# Patient Record
Sex: MALE | Race: WHITE | NOT HISPANIC OR LATINO | Employment: FULL TIME | URBAN - METROPOLITAN AREA
[De-identification: names, ages, dates, MRNs, and addresses within clinical notes are randomized per-mention and may not be internally consistent; named-entity substitution may affect disease eponyms.]

---

## 2018-09-11 ENCOUNTER — CONVERSION ENCOUNTER (OUTPATIENT)
Dept: OTHER | Facility: HOSPITAL | Age: 48
End: 2018-09-11

## 2018-09-25 ENCOUNTER — HOSPITAL ENCOUNTER (OUTPATIENT)
Dept: SLEEP MEDICINE | Facility: HOSPITAL | Age: 48
Discharge: HOME | End: 2018-09-25
Attending: PSYCHIATRY & NEUROLOGY | Admitting: PSYCHIATRY & NEUROLOGY

## 2019-06-04 VITALS
BODY MASS INDEX: 39.12 KG/M2 | DIASTOLIC BLOOD PRESSURE: 77 MMHG | SYSTOLIC BLOOD PRESSURE: 116 MMHG | WEIGHT: 249.25 LBS | HEART RATE: 76 BPM | HEIGHT: 67 IN

## 2019-07-16 ENCOUNTER — TELEPHONE (OUTPATIENT)
Dept: NEUROLOGY | Facility: CLINIC | Age: 49
End: 2019-07-16

## 2020-04-28 ENCOUNTER — TELEPHONE (OUTPATIENT)
Dept: NEUROLOGY | Facility: CLINIC | Age: 50
End: 2020-04-28

## 2020-04-28 NOTE — TELEPHONE ENCOUNTER
Has the patient been seen in the last 6 months? NO  If the patient has not been seen and the machine is broken, schedule an appointment and alert the practice  Current issue/concern with equipment: NO  DME company: [employer]  What kind of mask type (full or nasal)? FULL  Are you on a modem or chip? CHIP

## 2020-06-08 ENCOUNTER — OFFICE VISIT (OUTPATIENT)
Dept: NEUROLOGY | Facility: CLINIC | Age: 50
End: 2020-06-08

## 2020-06-08 ENCOUNTER — TELEPHONE (OUTPATIENT)
Dept: NEUROLOGY | Facility: CLINIC | Age: 50
End: 2020-06-08

## 2020-06-08 VITALS
DIASTOLIC BLOOD PRESSURE: 80 MMHG | HEART RATE: 76 BPM | SYSTOLIC BLOOD PRESSURE: 162 MMHG | HEIGHT: 68 IN | WEIGHT: 252.6 LBS | BODY MASS INDEX: 38.28 KG/M2 | TEMPERATURE: 98 F

## 2020-06-08 DIAGNOSIS — G47.33 OBSTRUCTIVE SLEEP APNEA: Primary | ICD-10-CM

## 2020-06-08 DIAGNOSIS — E66.09 CLASS 2 OBESITY DUE TO EXCESS CALORIES WITH BODY MASS INDEX (BMI) OF 39.0 TO 39.9 IN ADULT, UNSPECIFIED WHETHER SERIOUS COMORBIDITY PRESENT: ICD-10-CM

## 2020-06-08 PROBLEM — I10 HYPERTENSION: Status: ACTIVE | Noted: 2018-09-11

## 2020-06-08 PROBLEM — E78.5 HYPERLIPIDEMIA: Status: ACTIVE | Noted: 2018-09-11

## 2020-06-08 PROBLEM — R09.02 HYPOXIA: Status: ACTIVE | Noted: 2018-10-17

## 2020-06-08 PROCEDURE — FORMSMISC: Performed by: PSYCHIATRY & NEUROLOGY

## 2020-06-08 PROCEDURE — 99213 OFFICE O/P EST LOW 20 MIN: CPT | Performed by: PSYCHIATRY & NEUROLOGY

## 2020-06-08 RX ORDER — ATORVASTATIN CALCIUM 20 MG/1
TABLET, FILM COATED ORAL
COMMUNITY
Start: 2020-06-05

## 2020-06-08 RX ORDER — LISINOPRIL AND HYDROCHLOROTHIAZIDE 12.5; 1 MG/1; MG/1
TABLET ORAL EVERY 24 HOURS
COMMUNITY
Start: 2018-09-11 | End: 2022-06-09

## 2020-06-08 NOTE — PROGRESS NOTES
Sleep medicine follow-up visit    Aidan Hollingsworth   1970  49 y.o. male   DATE OF SERVICE: 6/8/2020     Yearly f/u for CPAP compliance, patient is a  and doing well with pap therapy. Patient f/u from sleep study with new cpap machine, sleeping well uses a FFM and goes through [employer] for supplies.     SLEEP TESTING HISTORY:    On NPSG at [facility] , 09/26/2018 patient had Severe obstructive sleep apnea syndrome with apnea-hypopnea index of 70.1 per sleep hour, minimum SpO2 of 59%    The compliance data reviewed and the patient is on CPAP therapy at 10-20 cm/H2O and compliance data indicates excellent compliance with 95% usage for more than 4 hours with an average usage of 6 hours 31 minutes. AHI down to 4.7 with CPAP therapy and mean CPAP pressure 12.9 cm of water.  The patient's hypersomnia also resolved with [place] Sleepiness Scale score of 0 with CPAP therapy.  The patient feels great and is benefiting from it and is compliant.     Review of Systems   Constitutional: Negative for appetite change and fatigue.   HENT: Negative for sinus pressure and sinus pain.    Eyes: Negative for pain and itching.   Respiratory: Positive for apnea. Negative for cough and shortness of breath.    Gastrointestinal: Negative for constipation and diarrhea.   Endocrine: Negative for cold intolerance and heat intolerance.   Genitourinary: Negative for difficulty urinating and frequency.   Musculoskeletal: Negative for back pain and neck pain.   Allergic/Immunologic: Negative for environmental allergies and food allergies.   Neurological: Negative for dizziness, tremors, seizures, syncope, facial asymmetry, speech difficulty, weakness, light-headedness, numbness and headaches.   Psychiatric/Behavioral: Negative for agitation and confusion.     I reviewed and addressed ROS entered by MA.        The following portions of the patient's history were reviewed and updated as appropriate: allergies, current medications, past family  history, past medical history, past social history, past surgical history and problem list.      No family history on file.    No past medical history on file.    Social History     Socioeconomic History   • Marital status:      Spouse name: Not on file   • Number of children: Not on file   • Years of education: Not on file   • Highest education level: Not on file       No current outpatient medications on file.    Allergies not on file     PHYSICAL EXAMINATION:  There were no vitals filed for this visit.   There is no height or weight on file to calculate BMI.       HEENT: Normal.      EXTREMITIES: No edema.     IMPRESSION:     Patient with obstructive sleep apnea syndrome with hypersomnia successfully treated with CPAP therapy and is compliant and benefiting from it.     Obesity, bmi 39,    Pt cleared for CDL    RECOMMENDATIONS:   1. Continue present CPAP.   2. Follow up 1 year.   3 will increase min pressure to 12  4 pt encouraged to lose weight    [place] SLEEPINESS SCALE  Sitting and reading  0  WatchingTV  0  Sitting, inactive, in a public place  0  As a passenger in a car for 1 hour w/o a break  0  Lying down to rest in the afternoon  0  Sitting and talking to someone  0  Sitting quietly after a lunch  0  In a car, while stopped for traffic or a light  0  Total 0        This document has been electronically signed by Joseph Seipel, MD on June 8, 2020 07:24

## 2021-06-10 NOTE — PROGRESS NOTES
"Chief Complaint  Sleep Apnea    Subjective          Aidan Hollingsworth presents to [facility] NEUROLOG[facility]  History of Present Illness   Yearly f/u for CPAP compliance, patient is a  and doing well with pap therapy.Sleeping well uses a FFM and goes through [employer] for supplies.     Sleep testing history:    On NPSG at [facility] , 09/26/2018 patient had Severe obstructive sleep apnea syndrome with apnea-hypopnea index of 70.1 per sleep hour, minimum SpO2 of 59%    PAP download:  The patient is on CPAP therapy at 14-20 cm/H2O.   Data indicates Excellent compliance. With 93% usage for more than 4 hours with an average usage of 6 hours 1 minutes. AHI down to 4.3 .  Average pressures 13.8.  Average large leak 26min.     The patient's hypersomnia has resolved       [place] Sleepiness Scale:  Sitting and reading 0 WatchingTV 0  Sitting, inactive, in a public place 0  As a passenger in a car for 1 hour w/o a break  0  Lying down to rest in the afternoon  0  Sitting and talking to someone  0  Sitting quietly after a lunch  0  In a car, while stopped for traffic or a light  0  Total 0  Review of Systems   Constitutional: Negative for fatigue and fever.   HENT: Negative for ear discharge and ear pain.    Eyes: Negative for pain and itching.   Respiratory: Negative for cough and shortness of breath.    Cardiovascular: Negative for chest pain.   Gastrointestinal: Negative for abdominal pain and nausea.   Endocrine: Negative for cold intolerance.   Genitourinary: Negative for urgency.   Musculoskeletal: Negative for back pain and neck pain.   Neurological: Negative for dizziness and light-headedness.   Psychiatric/Behavioral: Negative for agitation, confusion and sleep disturbance.       Objective   Vital Signs:   /63 (BP Location: Right arm, Patient Position: Sitting, Cuff Size: Adult)   Pulse 76   Temp 97.3 °F (36.3 °C)   Ht 171.5 cm (67.5\")   Wt 118 kg (260 lb)   BMI 40.12 kg/m²     Physical " Exam  Vitals reviewed.   Neurological:      Mental Status: He is alert and oriented to person, place, and time.        Result Review :                 Assessment and Plan    Diagnoses and all orders for this visit:    1. Obstructive sleep apnea (Primary)      Will increase pressure to 14-20  The patient is compliant with and benefiting from PAP therapy.      Follow Up   Return in about 1 year (around 6/16/2022).  Patient was given instructions and counseling regarding his condition or for health maintenance advice. Please see specific information pulled into the AVS if appropriate.

## 2021-06-16 ENCOUNTER — OFFICE VISIT (OUTPATIENT)
Dept: NEUROLOGY | Facility: CLINIC | Age: 51
End: 2021-06-16

## 2021-06-16 VITALS
SYSTOLIC BLOOD PRESSURE: 143 MMHG | DIASTOLIC BLOOD PRESSURE: 63 MMHG | HEART RATE: 76 BPM | TEMPERATURE: 97.3 F | HEIGHT: 68 IN | WEIGHT: 260 LBS | BODY MASS INDEX: 39.4 KG/M2

## 2021-06-16 DIAGNOSIS — G47.33 OBSTRUCTIVE SLEEP APNEA: Primary | ICD-10-CM

## 2021-06-16 PROCEDURE — 99212 OFFICE O/P EST SF 10 MIN: CPT | Performed by: PSYCHIATRY & NEUROLOGY

## 2021-06-16 RX ORDER — LOSARTAN POTASSIUM AND HYDROCHLOROTHIAZIDE 12.5; 5 MG/1; MG/1
1 TABLET ORAL DAILY
COMMUNITY
Start: 2021-05-26

## 2022-06-08 NOTE — PROGRESS NOTES
Chief Complaint  Sleep Apnea    Subjective          Aidan Hollingsworth presents to [facility] NEUROLOGY  History of Present Illness  Yearly f/u for CPAP compliance, patient is a  and doing well and benefiting from pap therapy,     uses a FFM and goes through [employer] for supplies.     patient states he has not registered his machine for recall yet he is unsure if its recalled ( info given)     Sleep testing history:    On NPSG at [facility] , 09/26/2018 patient had Severe obstructive sleep apnea syndrome with apnea-hypopnea index of 70.1 per sleep hour, minimum SpO2 of 59%    PAP download:  The patient is on CPAP therapy at 13.5-20 cm/H2O.   Data indicates Excellent compliance. With 91% usage for more than 4 hours with an average usage of 5 hours 56 minutes. AHI down to 3.7 .  Average pressures 14.9.  Average large leak 3.5min.     The patient's hypersomnia has resolved       [place] Sleepiness Scale:  Sitting and reading 1 WatchingTV 1  Sitting, inactive, in a public place 0  As a passenger in a car for 1 hour w/o a break  0  Lying down to rest in the afternoon  1  Sitting and talking to someone  0  Sitting quietly after a lunch  0  In a car, while stopped for traffic or a light  0  Total 3      Review of Systems   Constitutional: Negative for fatigue.   HENT: Negative for sinus pressure, sinus pain and sore throat.    Eyes: Negative for pain and itching.   Respiratory: Positive for apnea. Negative for shortness of breath.    Cardiovascular: Negative for chest pain and palpitations.   Gastrointestinal: Negative for abdominal distention and abdominal pain.   Genitourinary: Negative for frequency and urgency.   Musculoskeletal: Negative for neck pain and neck stiffness.   Neurological: Negative for dizziness, light-headedness and headaches.   Psychiatric/Behavioral: Negative for decreased concentration and sleep disturbance.     Objective   Vital Signs:   /76   Pulse 65   Temp 98.2 °F (36.8  "°C) (Temporal)   Resp 16   Ht 171.5 cm (67.5\")   Wt 117 kg (258 lb)   BMI 39.81 kg/m²     Physical Exam  Vitals reviewed.   Cardiovascular:      Pulses: Normal pulses.   Pulmonary:      Effort: Pulmonary effort is normal. No respiratory distress.   Neurological:      General: No focal deficit present.      Mental Status: He is alert and oriented to person, place, and time.   Psychiatric:         Mood and Affect: Mood normal.        Result Review :                 Assessment and Plan    Diagnoses and all orders for this visit:    1. Obstructive sleep apnea (Primary)      continue CPAP at current pressure     The patient is compliant with and benefiting from PAP therapy.      Follow Up   Return in about 1 year (around 6/9/2023).    Patient was given instructions and counseling regarding his condition or for health maintenance advice. Please see specific information pulled into the AVS if appropriate.       This document has been electronically signed by Joseph Seipel, MD on June 9, 2022 09:21 EDT  "

## 2022-06-09 ENCOUNTER — OFFICE VISIT (OUTPATIENT)
Dept: NEUROLOGY | Facility: CLINIC | Age: 52
End: 2022-06-09

## 2022-06-09 VITALS
WEIGHT: 258 LBS | BODY MASS INDEX: 39.1 KG/M2 | HEART RATE: 65 BPM | DIASTOLIC BLOOD PRESSURE: 76 MMHG | HEIGHT: 68 IN | SYSTOLIC BLOOD PRESSURE: 136 MMHG | TEMPERATURE: 98.2 F | RESPIRATION RATE: 16 BRPM

## 2022-06-09 DIAGNOSIS — G47.33 OBSTRUCTIVE SLEEP APNEA: Primary | ICD-10-CM

## 2022-06-09 PROCEDURE — 99213 OFFICE O/P EST LOW 20 MIN: CPT | Performed by: PSYCHIATRY & NEUROLOGY

## 2023-05-19 NOTE — PROGRESS NOTES
"Chief Complaint  Sleep Apnea    Subjective          Aidan Hollingsworth presents to [facility] NEUROLOGY  History of Present Illness    Yearly f/u for CPAP compliance, patient is a  and doing well and benefiting from pap therapy,      uses a FFM and goes through [email or web address] for supplies.     He states the new CPAP isn't not blowing as much air as his old one.           Sleep testing history:    On NPSG at [facility] , 09/26/2018 patient had Severe obstructive sleep apnea syndrome with apnea-hypopnea index of 70.1 per sleep hour, minimum SpO2 of 59%    PAP download:  The patient is on CPAP therapy at 13.5-20 cm/H2O.   Data indicates Excellent compliance. With 100% usage for more than 4 hours with an average usage of 6 hours 30 minutes. AHI down to 6.5 .  Average pressures 15.8.  Average large leak 20sec.     The patient's hypersomnia has resolved       [place] Sleepiness Scale:  Sitting and reading 0 WatchingTV 0  Sitting, inactive, in a public place 0  As a passenger in a car for 1 hour w/o a break  0  Lying down to rest in the afternoon  0  Sitting and talking to someone  0  Sitting quietly after a lunch  0  In a car, while stopped for traffic or a light  0  Total 0    Review of Systems   Constitutional:  Negative for fatigue.   Respiratory:  Negative for chest tightness.    Cardiovascular:  Negative for chest pain.   Psychiatric/Behavioral:  Negative for sleep disturbance.    All other systems reviewed and are negative.  Objective   Vital Signs:   /77 (BP Location: Right arm, Patient Position: Sitting, Cuff Size: Adult)   Pulse 73   Ht 171.5 cm (67.5\")   Wt 131 kg (289 lb)   BMI 44.60 kg/m²     Physical Exam  Pulmonary:      Effort: Pulmonary effort is normal. No respiratory distress.   Neurological:      Mental Status: He is alert.   Psychiatric:         Mood and Affect: Mood normal.         Behavior: Behavior normal.      Result Review :                 Assessment and Plan    Diagnoses and " all orders for this visit:    1. Obstructive sleep apnea (Primary)      Will increase pressure to min 16, max 20  Cdl form completed  The patient is compliant with and benefiting from PAP therapy.      Follow Up   Return in about 1 year (around 6/5/2024).    Patient was given instructions and counseling regarding his condition or for health maintenance advice. Please see specific information pulled into the AVS if appropriate.       This document has been electronically signed by Joseph Seipel, MD on June 5, 2023 08:34 EDT

## 2023-06-05 ENCOUNTER — OFFICE VISIT (OUTPATIENT)
Dept: NEUROLOGY | Facility: CLINIC | Age: 53
End: 2023-06-05
Payer: COMMERCIAL

## 2023-06-05 VITALS
DIASTOLIC BLOOD PRESSURE: 77 MMHG | BODY MASS INDEX: 43.8 KG/M2 | HEIGHT: 68 IN | HEART RATE: 73 BPM | SYSTOLIC BLOOD PRESSURE: 147 MMHG | WEIGHT: 289 LBS

## 2023-06-05 DIAGNOSIS — G47.33 OBSTRUCTIVE SLEEP APNEA: Primary | ICD-10-CM

## 2023-06-05 PROCEDURE — 99213 OFFICE O/P EST LOW 20 MIN: CPT | Performed by: PSYCHIATRY & NEUROLOGY

## 2024-05-31 ENCOUNTER — OFFICE VISIT (OUTPATIENT)
Dept: ORTHOPEDIC SURGERY | Facility: CLINIC | Age: 54
End: 2024-05-31
Payer: COMMERCIAL

## 2024-05-31 VITALS — HEIGHT: 69 IN | BODY MASS INDEX: 37.92 KG/M2 | WEIGHT: 256 LBS | RESPIRATION RATE: 20 BRPM

## 2024-05-31 DIAGNOSIS — M25.561 ACUTE PAIN OF RIGHT KNEE: Primary | ICD-10-CM

## 2024-05-31 PROCEDURE — 20610 DRAIN/INJ JOINT/BURSA W/O US: CPT | Performed by: NURSE PRACTITIONER

## 2024-05-31 PROCEDURE — 99213 OFFICE O/P EST LOW 20 MIN: CPT | Performed by: NURSE PRACTITIONER

## 2024-05-31 RX ADMIN — TRIAMCINOLONE ACETONIDE 80 MG: 40 INJECTION, SUSPENSION INTRA-ARTICULAR; INTRAMUSCULAR at 15:08

## 2024-05-31 RX ADMIN — LIDOCAINE HYDROCHLORIDE 2 ML: 10 INJECTION, SOLUTION INFILTRATION; PERINEURAL at 15:08

## 2024-05-31 NOTE — LETTER
May 31, 2024     Patient: Aidan Hollingsworth   YOB: 1970   Date of Visit: 5/31/2024       To Whom It May Concern:    It is my medical opinion that Aidan Hollingsworth should remain out of work until MD follow up and evaluation on 6/7/24 .           Sincerely,        SALEEM Perez    CC: No Recipients

## 2024-06-03 RX ORDER — TRIAMCINOLONE ACETONIDE 40 MG/ML
80 INJECTION, SUSPENSION INTRA-ARTICULAR; INTRAMUSCULAR
Status: COMPLETED | OUTPATIENT
Start: 2024-05-31 | End: 2024-05-31

## 2024-06-03 RX ORDER — LIDOCAINE HYDROCHLORIDE 10 MG/ML
2 INJECTION, SOLUTION INFILTRATION; PERINEURAL
Status: COMPLETED | OUTPATIENT
Start: 2024-05-31 | End: 2024-05-31

## 2024-06-03 NOTE — PROGRESS NOTES
[facility]        Patient Name: Aidan Hollingsworth  : 1970  Primary Care Physician: Conrado Stern APRN        Chief Complaint:    Chief Complaint   Patient presents with    Right Knee - Pain, Initial Evaluation, Injury     Pain level 4  DOI 24        HPI:   Aidan Hollingsworth is a 53 y.o. year old who presents today for evaluation of right knee pain.    He slipped and fell out of the bed of his truck 24. He denies hearing a pop or feeling a pulling sensation. He is unsure of how the knee landed but it could have been a twisting injury followed by contusion. He notes immediately after he had pain with weight bearing and also noted swelling.     He presented to outside provider. Xray imaging was negative. He was provided with 5 day prednisone taper and instructed to follow up with ortho.     Over the past couple days, his pain has improved. He still notes some swelling but overall improved. He is able to bear weight without difficulty.     He does continue to have pain with weight lifting, pushing, pulling. He has full ROM however. He denies any feelings of instability             Past Medical/Surgical, Social and Family History:  I have reviewed and/or updated pertinent history as noted in the medical record including:  Past Medical History:   Diagnosis Date    Hyperlipidemia     Hypertension     Sleep apnea      Past Surgical History:   Procedure Laterality Date    APPENDECTOMY      ORAL ANTRAL FISTULA CLOSURE       Social History     Occupational History    Not on file   Tobacco Use    Smoking status: Every Day     Current packs/day: 1.00     Average packs/day: 1 pack/day for 30.0 years (30.0 ttl pk-yrs)     Types: Cigarettes     Start date: 8/10/1994     Passive exposure: Never    Smokeless tobacco: Never   Vaping Use    Vaping status: Never Used   Substance and Sexual Activity    Alcohol use: Yes     Alcohol/week: 8.0 standard drinks of alcohol     Types: 8 Cans of beer per week     Comment: I dont  drink beer every week. Maybe every other weekend.    Drug use: Never    Sexual activity: Yes     Partners: Female     Birth control/protection: Condom, Natural family planning/Rhythm          Allergies: No Known Allergies    Medications:   Home Medications:  Current Outpatient Medications on File Prior to Visit   Medication Sig    atorvastatin (LIPITOR) 20 MG tablet     losartan-hydrochlorothiazide (HYZAAR) 50-12.5 MG per tablet Take 1 tablet by mouth Daily.    predniSONE (DELTASONE) 10 MG (21) dose pack Take  by mouth Daily. Use as directed on package     No current facility-administered medications on file prior to visit.         ROS:  Negative unless listed in the HPI    Physical Exam:   53 y.o. male  Body mass index is 38.36 kg/m²., 116 kg (256 lb)  Vitals:    05/31/24 1246   Resp: 20     General: Alert, cooperative, appears well and in no observable distress.   HEENT: Normocephalic, atraumatic on external visual inspection. No icterus.   CV: No significant peripheral edema.    Respiratory: Normal respiratory effort.   Skin: Warm & well perfused; appropriate skin turgor.  Psych: Appropriate mood & affect.  Neuro: Gross sensation and motor intact in affected extremity/extremities.  Vascular: Peripheral pulses palpable in affected extremity/extremities.     Ortho Exam     Right knee  No obvious deformity  No bruising, edema, abrasions, warmth or erythema  ROM is full 0-130 and painless  Negative varus/valgus stress testing  Negative johanna, negative ant/post drawer  Mild tenderness over the medial joint line  Negative patellar tenderness and apprehension. Negative grind  No crepitus  Gait is non antalgic and without limp       Radiology:  Narrative & Impression   XR KNEE 1 OR 2 VW RIGHT     Date of Exam: 5/27/2024 1:28 PM EDT     Indication: twisted     Comparison: None available.     Findings:  No definite soft tissue swelling. There is a questionable small knee joint effusion. No acute fracture or traumatic  malalignment. There are mild tricompartmental osteoarthritic changes most notably in the patellofemoral compartment.     IMPRESSION:  Impression:  No acute fracture or traumatic malalignment. Questionable small knee joint effusion.        Electronically Signed: Ap Benoit MD    5/27/2024 1:43 PM EDT    Workstation ID: [id number]       Large Joint Arthrocentesis: R knee  Date/Time: 5/31/2024 3:08 PM  Consent given by: patient  Site marked: site marked  Timeout: Immediately prior to procedure a time out was called to verify the correct patient, procedure, equipment, support staff and site/side marked as required   Supporting Documentation  Indications: pain and diagnostic evaluation   Procedure Details  Location: knee - R knee  Needle size: 25 G  Approach: anterolateral  Medications administered: 2 mL lidocaine 1 %; 80 mg triamcinolone acetonide 40 MG/ML  Patient tolerance: patient tolerated the procedure well with no immediate complications            Assessment:  Right knee pain, s/p fall  Body mass index is 38.36 kg/m².  BMI consistent with Obese Class II: 35-39.9kg/m2  Class 2 Severe Obesity (BMI >=35 and <=39.9). Obesity-related health conditions include the following: hypertension. Obesity is newly identified. BMI is is above average; no BMI management plan is appropriate. We discussed portion control and increasing exercise.        Plan:  I have reviewed the above imaging and exam with the patient in detail today  Given negative xray and improving clinical presentation, discussed symptom management and clinical follow up  Discussed steroid injection to the right knee today - reviewed risk/benefits. He was agreeable to proceed, please see documentation above  I have also recommend bracing of the right knee, RICE and remain off work until follow up exam  Follow up in 1 week  Patient encouraged to call with any questions or concerns in the interim    SALEEM Perez

## 2024-06-04 NOTE — PROGRESS NOTES
"Chief Complaint  Sleep Apnea    Subjective          Aidan Hollingsworth presents to [facility] NEUROLOGY  History of Present Illness    Yearly f/u for CPAP compliance, patient is a  and doing well and benefiting from pap therapy,      uses a FFM and goes through [employer] for supplies.         Sleep testing history:    On NPSG at [facility] , 09/26/2018 patient had Severe obstructive sleep apnea syndrome with apnea-hypopnea index of 70.1 per sleep hour, minimum SpO2 of 59%    PAP download:  The patient is on CPAP therapy at 16-20 cm/H2O.   Data indicates Excellent compliance. With 86% usage for more than 4 hours with an average usage of 6 hours 12 minutes. AHI down to 3.5 .  Average pressures 16.6.  Average large leak 9sec.     The patient's hypersomnia has resolved       [place] Sleepiness Scale:  Sitting and reading 0 WatchingTV 0  Sitting, inactive, in a public place 0  As a passenger in a car for 1 hour w/o a break  0  Lying down to rest in the afternoon  0  Sitting and talking to someone  0  Sitting quietly after a lunch  0  In a car, while stopped for traffic or a light  0  Total 0    Review of Systems   Constitutional:  Negative for fatigue.   Respiratory:  Negative for apnea and shortness of breath.    Psychiatric/Behavioral:  Negative for sleep disturbance.    All other systems reviewed and are negative.     Objective   Vital Signs:   /82   Pulse 88   Ht 174 cm (68.5\")   Wt 116 kg (256 lb)   BMI 38.36 kg/m²     Physical Exam  Vitals reviewed.   Cardiovascular:      Pulses: Normal pulses.   Pulmonary:      Effort: Pulmonary effort is normal. No respiratory distress.   Neurological:      General: No focal deficit present.      Mental Status: He is alert.   Psychiatric:         Mood and Affect: Mood normal.      Result Review :                 Assessment and Plan    Diagnoses and all orders for this visit:    1. Obstructive sleep apnea (Primary)      Continue cpap at current pressure "   The patient is compliant with and benefiting from PAP therapy.      Follow Up   Return in about 1 year (around 6/6/2025).    Patient was given instructions and counseling regarding his condition or for health maintenance advice. Please see specific information pulled into the AVS if appropriate.       This document has been electronically signed by Joseph Seipel, MD on June 6, 2024 08:56 EDT

## 2024-06-06 ENCOUNTER — OFFICE VISIT (OUTPATIENT)
Dept: NEUROLOGY | Facility: CLINIC | Age: 54
End: 2024-06-06
Payer: COMMERCIAL

## 2024-06-06 VITALS
BODY MASS INDEX: 37.92 KG/M2 | WEIGHT: 256 LBS | SYSTOLIC BLOOD PRESSURE: 155 MMHG | HEIGHT: 69 IN | DIASTOLIC BLOOD PRESSURE: 82 MMHG | HEART RATE: 88 BPM

## 2024-06-06 DIAGNOSIS — G47.33 OBSTRUCTIVE SLEEP APNEA: Primary | ICD-10-CM

## 2024-06-06 PROCEDURE — 99213 OFFICE O/P EST LOW 20 MIN: CPT | Performed by: PSYCHIATRY & NEUROLOGY

## 2024-06-07 ENCOUNTER — OFFICE VISIT (OUTPATIENT)
Dept: ORTHOPEDIC SURGERY | Facility: CLINIC | Age: 54
End: 2024-06-07
Payer: COMMERCIAL

## 2024-06-07 VITALS — OXYGEN SATURATION: 96 % | HEART RATE: 50 BPM | WEIGHT: 256 LBS | HEIGHT: 69 IN | BODY MASS INDEX: 37.92 KG/M2

## 2024-06-07 DIAGNOSIS — S86.911D KNEE STRAIN, RIGHT, SUBSEQUENT ENCOUNTER: Primary | ICD-10-CM

## 2024-06-07 NOTE — LETTER
June 7, 2024     Patient: Aidan Hollingsworth   YOB: 1970   Date of Visit: 6/7/2024       To Whom It May Concern:    It is my medical opinion that Aidan Hollingsworth may return to full duty immediately with no restrictions.           Sincerely,        SALEEM Perez    CC: No Recipients

## 2024-06-07 NOTE — PROGRESS NOTES
FOLLOW UP VISIT        Patient Name: Aidan Hollingsworth  : 1970  Primary Care Physician: Conrado Stern APRN        Chief Complaint:  right knee pain    HPI:   Aidan Hollingsworth is a 53 y.o. year old who presents today for follow up of the above.     At his last visit 1 week ago, he was provided with a Kenalog injection and REDI brace. Recommended additional 1 week off work along with RICE therapy.     Today, he reports improvement in his pain symptoms. He has been compliant with his brace and continues RICE along with a heating pad intermittently. He reports full ROM with only mild discomfort. He is able to ambulate stairs without trouble. He has been performing 1/2 squats for exercise and tolerating well. Denies any new swelling, numbness, pain. No instability in the right knee.       Past Medical/Surgical, Social and Family History:  I have reviewed and/or updated pertinent history as noted in the medical record including:  Past Medical History:   Diagnosis Date    Hyperlipidemia     Hypertension     Knee sprain [id number]    Slip and fall. Not at work    Sleep apnea      Past Surgical History:   Procedure Laterality Date    APPENDECTOMY      ORAL ANTRAL FISTULA CLOSURE       Social History     Occupational History    Not on file   Tobacco Use    Smoking status: Every Day     Current packs/day: 1.00     Average packs/day: 1 pack/day for 30.0 years (30.0 ttl pk-yrs)     Types: Cigarettes     Start date: 8/10/1994     Passive exposure: Never    Smokeless tobacco: Never   Vaping Use    Vaping status: Never Used   Substance and Sexual Activity    Alcohol use: Yes     Alcohol/week: 8.0 standard drinks of alcohol     Types: 8 Cans of beer per week     Comment: I dont drink beer every week. Maybe every other weekend.    Drug use: Never    Sexual activity: Yes     Partners: Female     Birth control/protection: Condom, Natural family planning/Rhythm      Social History     Social History Narrative    Not on file      Family History   Problem Relation Age of Onset    Diabetes Maternal Grandfather         Diagnosed with diabetes after 60 years old    Heart disease Paternal Grandfather     Cancer Mother          in  from Ovarian Cancer       Allergies: No Known Allergies    Medications:   Home Medications:  Current Outpatient Medications on File Prior to Visit   Medication Sig    atorvastatin (LIPITOR) 20 MG tablet     losartan-hydrochlorothiazide (HYZAAR) 50-12.5 MG per tablet Take 1 tablet by mouth Daily.    [DISCONTINUED] predniSONE (DELTASONE) 10 MG (21) dose pack Take  by mouth Daily. Use as directed on package (Patient not taking: Reported on 2024)     No current facility-administered medications on file prior to visit.         ROS:  14 point review of systems was negative except as listed in the HPI     Physical Exam:   53 y.o. male  Body mass index is 38.36 kg/m²., 116 kg (256 lb)  Vitals:    24 0945   Pulse: 50   SpO2: 96%         General: Alert, cooperative, appears well and in no observable distress.   HEENT: Normocephalic, atraumatic on external visual inspection. No icterus.   CV: No significant peripheral edema.   Respiratory: Normal respiratory effort.   Skin: Warm & well perfused; appropriate skin turgor.  Psych: Appropriate mood & affect.  Neuro: Gross sensation and motor intact in affected extremity/extremities.  Vascular: Peripheral pulses palpable in affected extremity/extremities. Calves/compartments soft and non tender, no evidence of DVT or compartment syndrome.    Ortho Exam      Right knee  No deformity  No bruising, swelling, erythema  No tenderness with palpation over the joint lines, patella, quad/patella tendon, popliteal space, MCL, LCL  ROM 0-130    Investigations:  No new x-rays were needed today.              Assessment:  Left knee strain  Body mass index is 38.36 kg/m².  BMI consistent with Obese Class II: 35-39.9kg/m2             Plan:  Symptoms improved with time, rest,  Kenalog and bracing  Recommend he continue bracing with activity  NSAIDs PRN for pain  Continue RICE and heat PRN  May RTW Monday without restrictions   Follow up PRN   Patient encouraged to call with questions or concerns in the interim      SALEEM Perez

## 2025-05-22 NOTE — PROGRESS NOTES
Sleep medicine follow-up visit    Aidan Hollingsworth   1970  54 y.o. male   DATE OF SERVICE: 5/27/2025     Yearly f/u for CPAP compliance, patient is a  and doing well and benefiting from pap therapy,      uses a FFM and goes through [employer] for supplies.   Wants pressures increased        Sleep testing history:    On NPSG at [email or web address] , 09/26/2018 patient had Severe obstructive sleep apnea syndrome with apnea-hypopnea index of 70.1 per sleep hour, minimum SpO2 of 59%    The compliance data reviewed and the patient is on CPAP therapy at [id number] cm/H2O and compliance data indicates excellent compliance with 70% usage for more than 4 hours with an average usage of 5 hours 36 minutes. AHI down to 3.5 with CPAP therapy and mean CPAP pressure 16.5 cm of water.  The patient's hypersomnia also resolved with [place] Sleepiness Scale score of 2 with CPAP therapy.  The patient feels great and is benefiting from it and is compliant.       EPWORTH SLEEPINESS SCALE  Sitting and reading JS [place] Sleepiness: 1 WatchingTV JS [place] Sleepiness: 0  Sitting, inactive, in a public place JS [place] Sleepiness: 0  As a passenger in a car for 1 hour w/o a break  JS [place] Sleepiness: 0  Lying down to rest in the afternoon  JS [place] Sleepiness: 1  Sitting and talking to someone  JS [place] Sleepiness: 0  Sitting quietly after a lunch  JS [place] Sleepiness: 0  In a car, while stopped for traffic or a light  JS [place] Sleepiness: 0  Total 2      Review of Systems  I reviewed and addressed ROS entered by MA.    The following portions of the patient's history were reviewed and updated as appropriate: allergies, current medications, past family history, past medical history, past social history, past surgical history and problem list.      Family History   Problem Relation Age of Onset    Diabetes Maternal Grandfather         Diagnosed with diabetes after 60 years old    Heart disease Paternal Grandfather     Cancer Mother           in  from Ovarian Cancer       Past Medical History:   Diagnosis Date    Hyperlipidemia     Hypertension     Knee sprain [id number]    Slip and fall. Not at work    Sleep apnea        Social History     Socioeconomic History    Marital status:    Tobacco Use    Smoking status: Every Day     Current packs/day: 1.00     Average packs/day: 1 pack/day for 31.0 years (31.0 ttl pk-yrs)     Types: Cigarettes     Start date: 8/10/1994     Passive exposure: Never    Smokeless tobacco: Never   Vaping Use    Vaping status: Never Used   Substance and Sexual Activity    Alcohol use: Yes     Alcohol/week: 8.0 standard drinks of alcohol     Types: 8 Cans of beer per week     Comment: I dont drink beer every week. Maybe every other weekend.    Drug use: Never    Sexual activity: Yes     Partners: Female     Birth control/protection: Condom, Natural family planning/Rhythm         Current Outpatient Medications:     atorvastatin (LIPITOR) 20 MG tablet, , Disp: , Rfl:     losartan-hydrochlorothiazide (HYZAAR) 50-12.5 MG per tablet, Take 1 tablet by mouth Daily., Disp: , Rfl:     No Known Allergies     PHYSICAL EXAMINATION:  Vitals:    25 1614   BP: 166/89   Pulse: 67      Body mass index is 38.66 kg/m².       HEENT: Normal.    CARDIAC: Normal.   LUNGS: Clear to auscultation.   EXTREMITIES: No edema.     Diagnoses and all orders for this visit:    1. Obstructive sleep apnea (Primary)     The patient was cautioned that obstructive sleep disordered breathing might be aggravated by certain conditions such as post anesthetic states or respiratory allergies.  Medications such as sedatives, hypnotics, muscle relaxants, opiate analgesics and or alcohol can aggravate the underlying obstructive sleep disordered breathing.If the patient is significantly drowsy or inattentive during the daytime, should be advised to avoid situations such as driving in which safety could be compromised by impairment of alertness.  Do not drive  if drowsy. Mask, Tubing, and Filters per DME. Call with any questions or concerns.        Return in about 1 year (around 5/27/2026) for Next scheduled follow up  Laya Gomez .    I spent 10 minutes caring for Aidan on this date of service. This time includes time spent by me in the following activities: preparing for the visit, reviewing tests, obtaining and/or reviewing a separately obtained history, performing a medically appropriate examination and/or evaluation, counseling and educating the patient/family/caregiver, and documenting information in the medical record.      This document has been electronically signed by Laya BORGES on May 27, 2025 16:45 EDT

## 2025-05-27 ENCOUNTER — OFFICE VISIT (OUTPATIENT)
Dept: NEUROLOGY | Facility: CLINIC | Age: 55
End: 2025-05-27
Payer: COMMERCIAL

## 2025-05-27 VITALS
SYSTOLIC BLOOD PRESSURE: 166 MMHG | WEIGHT: 258 LBS | HEART RATE: 67 BPM | HEIGHT: 69 IN | DIASTOLIC BLOOD PRESSURE: 89 MMHG | BODY MASS INDEX: 38.21 KG/M2

## 2025-05-27 DIAGNOSIS — G47.33 OBSTRUCTIVE SLEEP APNEA: Primary | ICD-10-CM

## 2025-05-27 PROCEDURE — 99212 OFFICE O/P EST SF 10 MIN: CPT | Performed by: NURSE PRACTITIONER

## 2025-06-03 ENCOUNTER — TELEPHONE (OUTPATIENT)
Dept: NEUROLOGY | Facility: CLINIC | Age: 55
End: 2025-06-03
Payer: COMMERCIAL

## 2025-06-03 DIAGNOSIS — G47.33 OBSTRUCTIVE SLEEP APNEA: Primary | ICD-10-CM
